# Patient Record
Sex: FEMALE | URBAN - METROPOLITAN AREA
[De-identification: names, ages, dates, MRNs, and addresses within clinical notes are randomized per-mention and may not be internally consistent; named-entity substitution may affect disease eponyms.]

---

## 2018-05-02 ENCOUNTER — APPOINTMENT (RX ONLY)
Dept: URBAN - METROPOLITAN AREA CLINIC 100 | Facility: CLINIC | Age: 73
Setting detail: DERMATOLOGY
End: 2018-05-02

## 2018-05-02 DIAGNOSIS — Z41.9 ENCOUNTER FOR PROCEDURE FOR PURPOSES OTHER THAN REMEDYING HEALTH STATE, UNSPECIFIED: ICD-10-CM

## 2018-05-02 PROCEDURE — ? DYSPORT

## 2018-05-02 PROCEDURE — ? FILLERS

## 2018-05-02 NOTE — PROCEDURE: DYSPORT
Glabellar Complex Units: [address] [facility]
Lot #: [id number]
Lateral Platysmal Bands Units: 0
Dilution (U/ 0.1cc): 1.5
Detail Level: Zone
Additional Area 2 Location: high forehead 2 cm above brows
Expiration Date (Month Year): 09/30/2018
Periorbital Skin Units: 60
Additional Area 3 Location: above left lateral brow
Post-Care Instructions: Patient instructed to not lie down for 4 hours and limit physical activity for 24 hours. Patient instructed not to travel by airplane for 48 hours.
Additional Area 4 Location: neck bands
Consent: Written consent obtained. Risks include but not limited to lid/brow ptosis, bruising, swelling, diplopia, temporary effect, incomplete chemical denervation.
Price (Use Numbers Only, No Special Characters Or $): 0.00
Additional Area 1 Location: lateral eyes
Additional Area 6 Location: neck

## 2018-05-02 NOTE — PROCEDURE: FILLERS
Additional Area 2 Volume In Cc: 0
Expiration Date (Month Year): 08/2019
Additional Area 1 Location: cheeks and Marionette
Filler: Gregg Collar
Use Map Statement For Sites (Optional): No
Consent: Written consent obtained. Risks include but not limited to bruising, beading, irregular texture, ulceration, infection, allergic reaction, scar formation, incomplete augmentation, temporary nature, procedural pain.
Additional Area 1 Volume In Cc: 1
Lot #: [id number]
Lot #: [id number]
Additional Area 1 Location: perioral fine lines, lateral mouth, marionette lines
Price (Use Numbers Only, No Special Characters Or $): 0.00
Anesthesia Type: 1% lidocaine without epinephrine
Map Statment: See [address] for Complete Details
Additional Anesthesia Volume In Cc: 6
Detail Level: Zone
Anesthesia Volume In Cc: 0.2
Post-Care Instructions: Patient instructed to apply ice to reduce swelling.

## 2019-01-09 ENCOUNTER — APPOINTMENT (RX ONLY)
Dept: URBAN - METROPOLITAN AREA CLINIC 100 | Facility: CLINIC | Age: 74
Setting detail: DERMATOLOGY
End: 2019-01-09

## 2019-01-09 DIAGNOSIS — Z41.9 ENCOUNTER FOR PROCEDURE FOR PURPOSES OTHER THAN REMEDYING HEALTH STATE, UNSPECIFIED: ICD-10-CM

## 2019-01-09 PROCEDURE — ? FILLERS

## 2019-01-09 PROCEDURE — ? DYSPORT

## 2019-01-09 NOTE — PROCEDURE: DYSPORT
Additional Area 3 Location: glabella
Anterior Platysmal Bands Units: 0
Additional Area 2 Location: lateral brows
Topical Anesthesia?: BLT cream (benzocaine 20%, lidocaine 10%, tetracaine 10%)
Additional Area 1 Location: lateral eyes
Lot #: [id number]
Additional Area 6 Location: left brow and glabella
Length Of Topical Anesthesia Application (Optional): 15 minutes
Additional Area 2 Units: 10
Detail Level: Detailed
Consent: Written consent obtained. Risks include but not limited to lid/brow ptosis, bruising, swelling, diplopia, temporary effect, incomplete chemical denervation.
Expiration Date (Month Year): 07/2019
Dilution (U/ 0.1cc): 1.5
Additional Area 5 Location: chin
Additional Area 1 Units: [address] [facility] [place]
Price (Use Numbers Only, No Special Characters Or $): 0.00
Glabellar Complex Units: [address]

## 2019-01-09 NOTE — PROCEDURE: FILLERS
Vermilion Lips Filler Volume In Cc: 0
Additional Anesthesia Volume In Cc: 6
Include Cannula Brand?: DermaSculpt
Include Cannula Information In Note?: No
Post-Care Instructions: Patient instructed to apply ice to reduce swelling.
Map Statment: See [address] for Complete Details
Additional Area 1 Location: Vermillion fine lines, lateral mouth
Expiration Date (Month Year): 09/03/20
Anesthesia Volume In Cc: 0.2
Consent: Written consent obtained. Risks include but not limited to bruising, beading, irregular texture, ulceration, infection, allergic reaction, scar formation, incomplete augmentation, temporary nature, procedural pain.
Lot #: [id number]
Additional Area 2 Location: Oral commissures, and perioral area injected using a cannula
Anesthesia Type: 1% lidocaine with epinephrine
Lot #: [id number]
Expiration Date (Month Year): 08/31/2019
Additional Area 5 Location: Cheeks with Cannula, vermillion lips, lateral mouth
Additional Area 3 Location: Glabbellar fine lines, Nasalabial folds, Marionette lines, vermillion lip
Price (Use Numbers Only, No Special Characters Or $): 0.00
Additional Area 4 Location: lateral Jawline, lateral cheeks
Detail Level: Zone
Additional Area 1 Location: lateral mouth, fine lines perioral, marionette lines, lateral cheeks, vermillion lips
Filler: Juvederm Volbella XC
Additional Area 3 Location: Medial cheeks injected Using a cannula
Additional Area 2 Location: Nasalabial, Glabellar fine lines- Complimentary
Include Cannula Length?: 1.5 inch
Additional Area 2 Location: medial cheeks with cannula , lat jawline
Lot #: [id number]
Additional Area 1 Volume In Cc: 1
Additional Area 1 Location: lateral mouth, vermillion lip, marionette lines, perioral fine lines
Include Cannula Size?: 25G
Expiration Date (Month Year): 06/02/2020

## 2020-02-19 ENCOUNTER — APPOINTMENT (RX ONLY)
Dept: URBAN - METROPOLITAN AREA CLINIC 100 | Facility: CLINIC | Age: 75
Setting detail: DERMATOLOGY
End: 2020-02-19

## 2020-02-19 DIAGNOSIS — Z41.9 ENCOUNTER FOR PROCEDURE FOR PURPOSES OTHER THAN REMEDYING HEALTH STATE, UNSPECIFIED: ICD-10-CM

## 2020-02-19 PROCEDURE — ? FILLERS

## 2020-02-19 PROCEDURE — ? DYSPORT

## 2020-02-19 NOTE — PROCEDURE: FILLERS
Decollete Filler  Volume In Cc: 0
Detail Level: Zone
Consent: Written consent obtained. Risks include but not limited to bruising, beading, irregular texture, ulceration, infection, allergic reaction, scar formation, incomplete augmentation, temporary nature, procedural pain.
Filler: Juvederm Volbella XC
Post-Care Instructions: Patient instructed to apply ice to reduce swelling.
Additional Area 1 Location: lateral mouth, fine lines perioral, marionette lines, lateral cheeks, vermillion lips
Additional Area 4 Location: lateral jawline, lateral mouth, glabella lines,
Additional Area 1 Location: lateral mouth, perioral fine lines, vermillion lips, marionette lines
Additional Area 2 Location: Lips, oral commissure, glabellar fine fines
Price (Use Numbers Only, No Special Characters Or $): 0.00
Additional Area 2 Location: Nasalabial, Glabellar fine lines- Complimentary
Additional Area 5 Location: Cheeks, vermillion lips, marionette fine lines, glabellar fine lines, lateral mouth
Use Map Statement For Sites (Optional): No
Additional Area 3 Location: Glabbellar fine lines, Nasalabial folds, Marionette lines, vermillion lip
Lot #: [id number]
Map Statment: See [address] for Complete Details
Additional Area 4 Location: Perioral
Anesthesia Type: 1% lidocaine without epinephrine
Expiration Date (Month Year): 2021-08-08
Lot #: [id number]
Lot #: [id number]
Expiration Date (Month Year): 08/31/2019
Expiration Date (Month Year): 09/2021
Include Cannula Size?: 25G
Include Cannula Length?: 1.5 inch
Additional Area 1 Location: fine lines perioral, vermillion lips, lateral mouth
Additional Anesthesia Volume In Cc: 6
Additional Area 1 Volume In Cc: 1
Additional Area 2 Location: cheeks, jawline, oral commissure
Include Cannula Brand?: DermaSculpt

## 2020-02-19 NOTE — PROCEDURE: DYSPORT
Expiration Date (Month Year): 09/30/2020
Additional Area 1 Units: [address]
Levator Labii Superioris Units: 0
Additional Area 5 Location: glabella
Detail Level: Simple
Lot #: [id number]
Additional Area 2 Units: 10
Additional Area 2 Location: lateral brows
Length Of Topical Anesthesia Application (Optional): 15 minutes
Consent: Written consent obtained. Risks include but not limited to lid/brow ptosis, bruising, swelling, diplopia, temporary effect, incomplete chemical denervation.
Additional Area 3 Location: high forehead 3 cm above brows
Topical Anesthesia?: 20% benzocaine, 8% lidocaine, 4% tetracaine
Price (Use Numbers Only, No Special Characters Or $): 0.00
Additional Area 4 Location: 2cm high forehead
Dilution (U/ 0.1cc): 1.5